# Patient Record
Sex: MALE | URBAN - METROPOLITAN AREA
[De-identification: names, ages, dates, MRNs, and addresses within clinical notes are randomized per-mention and may not be internally consistent; named-entity substitution may affect disease eponyms.]

---

## 2019-01-11 ENCOUNTER — OFFICE VISIT (OUTPATIENT)
Dept: URBAN - METROPOLITAN AREA CLINIC 23 | Facility: CLINIC | Age: 84
End: 2019-01-11
Payer: OTHER GOVERNMENT

## 2019-01-11 DIAGNOSIS — H26.492 OTHER SECONDARY CATARACT, LEFT EYE: ICD-10-CM

## 2019-01-11 PROCEDURE — 92004 COMPRE OPH EXAM NEW PT 1/>: CPT | Performed by: OPHTHALMOLOGY

## 2019-01-11 PROCEDURE — 92134 CPTRZ OPH DX IMG PST SGM RTA: CPT | Performed by: OPHTHALMOLOGY

## 2019-01-11 ASSESSMENT — INTRAOCULAR PRESSURE
OD: 12
OS: 12

## 2019-01-11 NOTE — IMPRESSION/PLAN
Impression: Exdtve age-rel mclr degn, left eye, with actv chrdl neovas: H35.3221. OS. Condition: established, stable. Vision: vision affected. s/p AV OS (Several tx) last tx 10/15/2018 in [facility] [place]: Discussed diagnosis in detail with patient. Exam and OCT shows no obvious fluid. Gave patient options: Observation and reassess in 3 wks or schedule a injection for continuation of care. Pt wished to observe today. No treatment is required at this time based on exam and OCT. Recommend observation for now. Will reassess condition in 3 wks. OCT shows PED w/ Fluid below RPE level, no IRF/SRF Pt is being monitored for glaucoma in [place]. Pt states that he gets enough refills from medication from his glaucoma doctor in [place], and will continue glaucoma care in [place].

## 2019-01-11 NOTE — IMPRESSION/PLAN
Impression: Other secondary cataract, left eye: H26.492. OS. Condition: established, stable. Vision: vision  affected. Plan: Discussed diagnosis in detail with patient. Recommend Yag Laser treatment for vision improvement. Discussed risks/benefits of laser TX. All questions answered. Patient elects to proceed with recommendation.

## 2019-01-11 NOTE — IMPRESSION/PLAN
Impression: Nexdtve age-related mclr degn, right eye, intermed dry stage: H35.3112. OD. OD. Condition: established, stable. Vision: vision not affected. Plan: Discussed diagnosis in detail with patient. No treatment is required at this time. Use of vitamins has shown to improve the effects of ARMD. Recommend AREDS 2 formula. Wear quality sunglasses and monitor vision at home with [address]. Call the office for an immediate appointment if [address] worsens. Educational material provided to patient. OCT performed today: no IRF or SRF OD - stable.

## 2019-01-22 ENCOUNTER — SURGERY (OUTPATIENT)
Dept: URBAN - METROPOLITAN AREA SURGERY 11 | Facility: SURGERY | Age: 84
End: 2019-01-22
Payer: OTHER GOVERNMENT

## 2019-01-22 PROCEDURE — 66821 AFTER CATARACT LASER SURGERY: CPT | Performed by: OPHTHALMOLOGY

## 2019-01-29 ENCOUNTER — POST-OPERATIVE VISIT (OUTPATIENT)
Dept: URBAN - METROPOLITAN AREA CLINIC 23 | Facility: CLINIC | Age: 84
End: 2019-01-29

## 2019-01-29 DIAGNOSIS — Z09 ENCNTR FOR F/U EXAM AFT TRTMT FOR COND OTH THAN MALIG NEOPLM: Primary | ICD-10-CM

## 2019-01-29 PROCEDURE — 99024 POSTOP FOLLOW-UP VISIT: CPT | Performed by: OPTOMETRIST

## 2019-01-29 ASSESSMENT — INTRAOCULAR PRESSURE
OD: 11
OS: 10

## 2019-02-01 ENCOUNTER — OFFICE VISIT (OUTPATIENT)
Dept: URBAN - METROPOLITAN AREA CLINIC 23 | Facility: CLINIC | Age: 84
End: 2019-02-01
Payer: OTHER GOVERNMENT

## 2019-02-01 DIAGNOSIS — H35.3112 NEXDTVE AGE-RELATED MCLR DEGN, RIGHT EYE, INTERMED DRY STAGE: ICD-10-CM

## 2019-02-01 PROCEDURE — 99213 OFFICE O/P EST LOW 20 MIN: CPT | Performed by: OPHTHALMOLOGY

## 2019-02-01 PROCEDURE — 67028 INJECTION EYE DRUG: CPT | Performed by: OPHTHALMOLOGY

## 2019-02-01 PROCEDURE — 92134 CPTRZ OPH DX IMG PST SGM RTA: CPT | Performed by: OPHTHALMOLOGY

## 2019-02-01 ASSESSMENT — INTRAOCULAR PRESSURE
OD: 11
OS: 11

## 2019-02-01 NOTE — IMPRESSION/PLAN
Impression: Nexdtve age-related mclr degn, right eye, intermed dry stage: H35.3112. OD. OD. Condition: established, stable. Vision: vision not affected. Plan: Discussed diagnosis in detail with patient. Discussed risks of progression with present condition. Exam shows the macula is stable. No treatment is needed at this time. Recommend observation for now. Continue taking the eye vitamins - AREDS 2 formula. OCT shows no IRF or SRF OD  .

## 2019-02-01 NOTE — IMPRESSION/PLAN
Impression: Exdtve age-rel mclr degn, left eye, with actv chrdl neovas: H35.3221. OS. Condition: established, stable. Vision: vision affected. s/p AV OS (Several tx) last tx 10/15/2018 in [place]: Discussed diagnosis in detail with patient. Discussed risks of progression. Based on today's exam, diagnostic studies and review of records, recommend to continue with KERRI tx to help reduce the fluid in order to prevent a further reduction in vision. An examination that was significantly and separately identifiable from the procedure was performed today. Discussed the risks and benefits of tx. Patient elects to proceed with recommendation.  OCT shows SRN with mild SRF slight increase since Jan 11,19

## 2019-02-01 NOTE — IMPRESSION/PLAN
Impression: Other vitreous opacities, left eye: H43.392. OS. Condition: stable. Vision: vision not affected. Plan: Discussed diagnosis in detail with patient. Explained exam shows no evidence of retinal pathology. Discussed signs and symptoms of PVD/floaters. Discussed signs and symptoms of retinal detachment. No treatment is required at this time. Will continue to observe condition and or symptoms. Come in ASAP if there is a change or decrease in vision.

## 2019-03-08 ENCOUNTER — OFFICE VISIT (OUTPATIENT)
Dept: URBAN - METROPOLITAN AREA CLINIC 23 | Facility: CLINIC | Age: 84
End: 2019-03-08
Payer: OTHER GOVERNMENT

## 2019-03-08 PROCEDURE — 92134 CPTRZ OPH DX IMG PST SGM RTA: CPT | Performed by: OPHTHALMOLOGY

## 2019-03-08 PROCEDURE — 99213 OFFICE O/P EST LOW 20 MIN: CPT | Performed by: OPHTHALMOLOGY

## 2019-03-08 PROCEDURE — 67028 INJECTION EYE DRUG: CPT | Performed by: OPHTHALMOLOGY

## 2019-03-08 ASSESSMENT — INTRAOCULAR PRESSURE
OD: 10
OS: 10

## 2019-03-08 NOTE — IMPRESSION/PLAN
Impression: Exdtve age-rel mclr nichole, left eye, with actv chrdl neovas: H35.3221. OS. Condition: improving. Vision: vision affected. s/p AV OS #1 02/01/2019 w/Dr Bryan
s/p AV OS (Several tx) last tx 10/15/2018 in [facility] [place]: Discussed diagnosis in detail with patient. Discussed risks of progression. Based on today's exam, diagnostic studies and review of records, recommend to continue with KERRI tx to help reduce the fluid in order to prevent a further reduction in vision. An examination that was significantly and separately identifiable from the procedure was performed today. Discussed the risks and benefits of tx. Patient elects to proceed with recommendation. OCT shows a decrease in CMT - decrease in fluid OS. Recommend to hold off on new glasses Rx but if he is struggling with glasses ok to proceed with a refraction for possible vision improvement OD. Patient states he will wait until he returns to [address].

## 2019-10-28 ENCOUNTER — OFFICE VISIT (OUTPATIENT)
Dept: URBAN - METROPOLITAN AREA CLINIC 23 | Facility: CLINIC | Age: 84
End: 2019-10-28
Payer: OTHER GOVERNMENT

## 2019-10-28 DIAGNOSIS — H43.392 OTHER VITREOUS OPACITIES, LEFT EYE: ICD-10-CM

## 2019-10-28 PROCEDURE — 99213 OFFICE O/P EST LOW 20 MIN: CPT | Performed by: OPHTHALMOLOGY

## 2019-10-28 PROCEDURE — 92134 CPTRZ OPH DX IMG PST SGM RTA: CPT | Performed by: OPHTHALMOLOGY

## 2019-10-28 ASSESSMENT — INTRAOCULAR PRESSURE
OD: 10
OS: 10

## 2019-10-28 NOTE — IMPRESSION/PLAN
Impression: Exdtve age-rel mclr degn, left eye, with actv chrdl neovas: H35.3221. OS. Condition: improving. Vision: vision affected. s/p AV OS 03/08/2019 w/Dr Bryan
s/p AV OS 09/15/2019 w/ Dr Ciro Ramos in [facility]: Discussed diagnosis in detail with patient. Discussed risks of progression. Based on today's exam, diagnostic studies and review of records, recommend to continue with AV KERRI tx to help reduce the fluid in order to prevent a further reduction in vision. Discussed the risks and benefits of tx. Patient elects to proceed with recommendation.

## 2019-11-08 ENCOUNTER — PROCEDURE (OUTPATIENT)
Dept: URBAN - METROPOLITAN AREA CLINIC 23 | Facility: CLINIC | Age: 84
End: 2019-11-08
Payer: OTHER GOVERNMENT

## 2019-11-08 PROCEDURE — 67028 INJECTION EYE DRUG: CPT | Performed by: OPHTHALMOLOGY

## 2019-12-20 ENCOUNTER — OFFICE VISIT (OUTPATIENT)
Dept: URBAN - METROPOLITAN AREA CLINIC 23 | Facility: CLINIC | Age: 84
End: 2019-12-20
Payer: OTHER GOVERNMENT

## 2019-12-20 PROCEDURE — 92134 CPTRZ OPH DX IMG PST SGM RTA: CPT | Performed by: OPHTHALMOLOGY

## 2019-12-20 PROCEDURE — 99213 OFFICE O/P EST LOW 20 MIN: CPT | Performed by: OPHTHALMOLOGY

## 2019-12-20 ASSESSMENT — INTRAOCULAR PRESSURE
OD: 13
OS: 11

## 2019-12-20 NOTE — IMPRESSION/PLAN
Impression: Exdtve age-rel mclr degn, left eye, with actv chrdl neovas: H35.3221. OS. Condition: improving. Vision: vision affected. s/p AV OS 11/8/2019, AV OS 03/08/2019 w/Dr Bryan
s/p AV OS 09/15/2019 w/ Dr Kianna Ferguson in [facility] [place]: Discussed diagnosis in detail with patient. No treatment is required at this time based on exam and OCT. Recommend close observation for now. Will reassess condition in 1 month.  OCT shows no IRF or SRF - stable

## 2020-01-20 ENCOUNTER — OFFICE VISIT (OUTPATIENT)
Dept: URBAN - METROPOLITAN AREA CLINIC 23 | Facility: CLINIC | Age: 85
End: 2020-01-20
Payer: OTHER GOVERNMENT

## 2020-01-20 PROCEDURE — 99213 OFFICE O/P EST LOW 20 MIN: CPT | Performed by: OPHTHALMOLOGY

## 2020-01-20 PROCEDURE — 92134 CPTRZ OPH DX IMG PST SGM RTA: CPT | Performed by: OPHTHALMOLOGY

## 2020-01-20 ASSESSMENT — INTRAOCULAR PRESSURE
OS: 14
OD: 15

## 2020-01-20 NOTE — IMPRESSION/PLAN
Impression: Exdtve age-rel mclr degn, left eye, with actv chrdl neovas: H35.3221. OS. Condition: improving. Vision: vision affected. s/p AV OS 11/8/2019, AV OS 03/08/2019 w/Dr Bryan
s/p AV OS 09/15/2019 w/ Dr Vanna Farias in [facility] [place]: Discussed diagnosis in detail with patient. No treatment is required at this time based on exam and OCT. Recommend close observation for now. Will reassess condition in 1 month.  OCT shows SR thickening no obvious change OS

## 2020-02-17 ENCOUNTER — OFFICE VISIT (OUTPATIENT)
Dept: URBAN - METROPOLITAN AREA CLINIC 23 | Facility: CLINIC | Age: 85
End: 2020-02-17
Payer: OTHER GOVERNMENT

## 2020-02-17 PROCEDURE — 99213 OFFICE O/P EST LOW 20 MIN: CPT | Performed by: OPHTHALMOLOGY

## 2020-02-17 PROCEDURE — 92134 CPTRZ OPH DX IMG PST SGM RTA: CPT | Performed by: OPHTHALMOLOGY

## 2020-02-17 ASSESSMENT — INTRAOCULAR PRESSURE
OD: 11
OS: 11

## 2020-02-17 NOTE — IMPRESSION/PLAN
Impression: Nexdtve age-related mclr degn, right eye, intermed dry stage: H35.3112. OD. OD. Condition: established, stable. Vision: vision not affected. Plan: Discussed diagnosis in detail with patient. Discussed risks of progression with present condition. Exam shows the macula is stable. No treatment is needed at this time. Recommend observation for now. Continue taking the eye vitamins - AREDS 2 formula. OCT shows no IRF or SRF OD.

## 2020-02-17 NOTE — IMPRESSION/PLAN
Impression: Exdtve age-rel mclr degn, left eye, with actv chrdl neovas: H35.3221. OS. Condition: worsening. Vision: vision affected. s/p AV OS 11/8/2019, AV OS 03/08/2019 w/Dr Bryan
s/p AV OS 09/15/2019 w/ Dr Orion Garcia in [facility] [place]: Discussed diagnosis in detail with patient. Discussed risks of progression with present condition. Based on findings recommend Intravitreal Injection Treatment to help reduce the fluid and prevent a further reduction in vision. Discussed the risks and benefits of tx. All questions answered. Patient elects to proceed with recommendation. OCT shows an increase in CMT OS.

## 2020-03-13 ENCOUNTER — PROCEDURE (OUTPATIENT)
Dept: URBAN - METROPOLITAN AREA CLINIC 23 | Facility: CLINIC | Age: 85
End: 2020-03-13
Payer: OTHER GOVERNMENT

## 2020-03-13 DIAGNOSIS — H35.3221 EXDTVE AGE-REL MCLR DEGN, LEFT EYE, WITH ACTV CHRDL NEOVAS: Primary | ICD-10-CM

## 2020-03-13 PROCEDURE — 67028 INJECTION EYE DRUG: CPT | Performed by: OPHTHALMOLOGY

## 2020-11-02 ENCOUNTER — OFFICE VISIT (OUTPATIENT)
Dept: URBAN - METROPOLITAN AREA CLINIC 32 | Facility: CLINIC | Age: 85
End: 2020-11-02
Payer: OTHER GOVERNMENT

## 2020-11-02 PROCEDURE — 99213 OFFICE O/P EST LOW 20 MIN: CPT | Performed by: OPHTHALMOLOGY

## 2020-11-02 PROCEDURE — 92134 CPTRZ OPH DX IMG PST SGM RTA: CPT | Performed by: OPHTHALMOLOGY

## 2020-11-02 ASSESSMENT — INTRAOCULAR PRESSURE
OS: 13
OD: 13

## 2020-11-02 NOTE — IMPRESSION/PLAN
Impression: Exdtve age-rel mclr degn, left eye, with actv chrdl neovas: H35.3221. OS. Condition: stable. Vision: vision affected. s/p AV OS 11/8/2019, AV OS 03/08/2019 w/Dr Bryan
s/p AV OS 10/2020, 09/15/2019 w/ Dr Jimbo Porras in [facility]: Discussed diagnosis in detail with patient. No treatment is required at this time based on exam and OCT. Recommend observation for now. Will reassess condition in 6 - 8 wks. OCT shows no IRF or SRF - stable OS.

## 2020-12-14 ENCOUNTER — OFFICE VISIT (OUTPATIENT)
Dept: URBAN - METROPOLITAN AREA CLINIC 32 | Facility: CLINIC | Age: 85
End: 2020-12-14
Payer: OTHER GOVERNMENT

## 2020-12-14 PROCEDURE — 92134 CPTRZ OPH DX IMG PST SGM RTA: CPT | Performed by: OPHTHALMOLOGY

## 2020-12-14 PROCEDURE — 99213 OFFICE O/P EST LOW 20 MIN: CPT | Performed by: OPHTHALMOLOGY

## 2020-12-14 ASSESSMENT — INTRAOCULAR PRESSURE
OD: 13
OS: 11

## 2020-12-14 NOTE — IMPRESSION/PLAN
Impression: Exdtve age-rel mclr degn, left eye, with actv chrdl neovas: H35.3221. OS. Condition: stable. Vision: vision affected. s/p AV OS 11/8/2019, AV OS 03/08/2019 w/Dr Bryan
s/p AV OS 10/2020, 09/15/2019 w/ Dr Sylvie Chavarria in [facility]: Discussed diagnosis in detail with patient. No treatment is required at this time based on exam and OCT. Recommend observation for now. Will reassess condition in  8 wks. OCT shows no IRF or SRF - stable OS.

## 2021-02-25 ENCOUNTER — OFFICE VISIT (OUTPATIENT)
Dept: URBAN - METROPOLITAN AREA CLINIC 23 | Facility: CLINIC | Age: 86
End: 2021-02-25
Payer: OTHER GOVERNMENT

## 2021-02-25 PROCEDURE — 99213 OFFICE O/P EST LOW 20 MIN: CPT | Performed by: OPHTHALMOLOGY

## 2021-02-25 PROCEDURE — 92134 CPTRZ OPH DX IMG PST SGM RTA: CPT | Performed by: OPHTHALMOLOGY

## 2021-02-25 ASSESSMENT — INTRAOCULAR PRESSURE
OD: 12
OS: 10

## 2021-02-25 NOTE — IMPRESSION/PLAN
Impression: Exdtve age-rel mclr degn, left eye, with actv chrdl neovas: H35.3221. OS. Condition: stable. Vision: vision affected. s/p AV OS 03/13/2020 w/Dr Bryan
s/p AV OS 10/2020, 09/15/2019 w/ Dr Jayna Fitzpatrick in [facility]: Discussed diagnosis in detail with patient. No treatment is required at this time based on exam and OCT. Recommend observation for now. Will reassess condition in  3-4 months in [place] with Dr. Jayna Fitzpatrick. OCT shows scarring minimal fluid OS and Optos shows stable inactive SRN no fluid or heme seen OS.

## 2021-02-25 NOTE — IMPRESSION/PLAN
Impression: Nexdtve age-related mclr degn, right eye, intermed dry stage: H35.3112. OD. OD. Condition: established, stable. Vision: vision not affected. Plan: Discussed diagnosis in detail with patient. Discussed risks of progression with present condition. Exam shows the macula is stable. No treatment is needed at this time. Recommend observation for now. Continue taking the eye vitamins - AREDS 2 formula. OCT and Optos shows no IRF or SRF OD.